# Patient Record
Sex: FEMALE | Race: OTHER | HISPANIC OR LATINO | URBAN - METROPOLITAN AREA
[De-identification: names, ages, dates, MRNs, and addresses within clinical notes are randomized per-mention and may not be internally consistent; named-entity substitution may affect disease eponyms.]

---

## 2018-11-10 ENCOUNTER — EMERGENCY (EMERGENCY)
Facility: HOSPITAL | Age: 18
LOS: 1 days | Discharge: HOME | End: 2018-11-10
Attending: EMERGENCY MEDICINE | Admitting: EMERGENCY MEDICINE
Payer: SELF-PAY

## 2018-11-10 VITALS
RESPIRATION RATE: 17 BRPM | TEMPERATURE: 98 F | OXYGEN SATURATION: 99 % | DIASTOLIC BLOOD PRESSURE: 72 MMHG | HEART RATE: 96 BPM | SYSTOLIC BLOOD PRESSURE: 107 MMHG

## 2018-11-10 VITALS — RESPIRATION RATE: 16 BRPM | OXYGEN SATURATION: 99 %

## 2018-11-10 DIAGNOSIS — R00.2 PALPITATIONS: ICD-10-CM

## 2018-11-10 DIAGNOSIS — R55 SYNCOPE AND COLLAPSE: ICD-10-CM

## 2018-11-10 DIAGNOSIS — Z88.2 ALLERGY STATUS TO SULFONAMIDES: ICD-10-CM

## 2018-11-10 PROCEDURE — 99053 MED SERV 10PM-8AM 24 HR FAC: CPT

## 2018-11-10 PROCEDURE — 93010 ELECTROCARDIOGRAM REPORT: CPT

## 2018-11-10 PROCEDURE — 99284 EMERGENCY DEPT VISIT MOD MDM: CPT | Mod: 25

## 2018-11-10 NOTE — ED ADULT TRIAGE NOTE - CHIEF COMPLAINT QUOTE
BIBA, ambulatory, complaining of palpitations. Pt states she was waiting for a train when she felt weak, dizzy and started having palpitations. States feeling a lot better now. Denies alcohol/substance use.

## 2018-11-10 NOTE — ED PROVIDER NOTE - MEDICAL DECISION MAKING DETAILS
Pt w near syncope episode, most likely related to hydration status, pt would prefer to go home and rest and drink fluids and be discharged. Explained to [facility]TER if any worsening.

## 2018-11-10 NOTE — ED ADULT NURSE NOTE - NSIMPLEMENTINTERV_GEN_ALL_ED
Implemented All Universal Safety Interventions:  [place] to call system. Call bell, personal items and telephone within reach. Instruct patient to call for assistance. Room bathroom lighting operational. Non-slip footwear when patient is off stretcher. Physically safe environment: no spills, clutter or unnecessary equipment. Stretcher in lowest position, wheels locked, appropriate side rails in place.

## 2018-11-10 NOTE — ED PROVIDER NOTE - OBJECTIVE STATEMENT
18 female pt, no hx of med problems, nkda, on birth control. Presents aftef near syncopal episode. Pt had palpitation sensation, then felt everything closing on her and weak. no chest  pain. no abd pain, no nausea, no vomiting. no syncope, no head trauma. Had a long day, went to bed late and had to get up early to visit [place].

## 2018-11-10 NOTE — ED ADULT NURSE NOTE - OBJECTIVE STATEMENT
Pt with sudden onset palpitations, dizziness while waiting for the train. All symptoms resolved in ED. Calm and conversant
